# Patient Record
Sex: MALE
[De-identification: names, ages, dates, MRNs, and addresses within clinical notes are randomized per-mention and may not be internally consistent; named-entity substitution may affect disease eponyms.]

---

## 2022-04-19 ENCOUNTER — TRANSCRIPTION ENCOUNTER (OUTPATIENT)
Age: 82
End: 2022-04-19

## 2022-10-20 ENCOUNTER — NON-APPOINTMENT (OUTPATIENT)
Age: 82
End: 2022-10-20

## 2022-11-24 ENCOUNTER — NON-APPOINTMENT (OUTPATIENT)
Age: 82
End: 2022-11-24

## 2023-05-20 ENCOUNTER — NON-APPOINTMENT (OUTPATIENT)
Age: 83
End: 2023-05-20

## 2023-05-30 PROBLEM — Z00.00 ENCOUNTER FOR PREVENTIVE HEALTH EXAMINATION: Status: ACTIVE | Noted: 2023-05-30

## 2023-06-05 ENCOUNTER — APPOINTMENT (OUTPATIENT)
Dept: ORTHOPEDIC SURGERY | Facility: CLINIC | Age: 83
End: 2023-06-05
Payer: MEDICARE

## 2023-06-05 VITALS
BODY MASS INDEX: 30.8 KG/M2 | OXYGEN SATURATION: 98 % | HEIGHT: 71 IN | SYSTOLIC BLOOD PRESSURE: 130 MMHG | WEIGHT: 220 LBS | DIASTOLIC BLOOD PRESSURE: 72 MMHG | HEART RATE: 57 BPM

## 2023-06-05 DIAGNOSIS — M17.0 BILATERAL PRIMARY OSTEOARTHRITIS OF KNEE: ICD-10-CM

## 2023-06-05 PROCEDURE — 99204 OFFICE O/P NEW MOD 45 MIN: CPT

## 2023-06-05 PROCEDURE — 73564 X-RAY EXAM KNEE 4 OR MORE: CPT | Mod: 50

## 2023-06-05 NOTE — PHYSICAL EXAM
[de-identified] : Left and right knee with medial joint tenderness predominant positive crepitance and ROM 5 degrees to 125 degrees.  [de-identified] : 4 views of both knees show bilateral knee OA with varus alignment.

## 2023-06-05 NOTE — HISTORY OF PRESENT ILLNESS
[de-identified] : CHAKA PHELPS is a 82 year  old  M patient that presents today with bilateral knee pain. The pain started about a year and half ago but has gotten progressively worst with the past 6 months.Pt has been doing physical therapy for a month now which he states is is helping. The pain is described at sharp, aching with soreness. Pt denies any buckling or locking. Pt is interested in "stem cells injection" Referred to us by Cynthia Parham.\par

## 2023-06-05 NOTE — DISCUSSION/SUMMARY
[de-identified] : 82 year old active  (doubles) with OA sent by Cynthia Parham to consider PRP or other orthobiologic injections. \par He is a helathy male in no acute distrewss and very com pliant. \par We had a long discussion today about the risks and benefits of various Orthobiologic injection procedures. These included PRP, Amniotic Allograft product, Lipogems/lipoaspirate injections and BMAC. The fact that these treatments are investigational and not approved by any insurance product was also discussed and patient agreed to joining the [facility] Orthobiologic Registry and having his data stored in there and in the [facility] anonymized database for further study of Orthobiologic treatments. All questions were answered and informed consent will be obtained at the time of procedure.He will do prp for both knees. \par

## 2023-06-27 ENCOUNTER — APPOINTMENT (OUTPATIENT)
Dept: ORTHOPEDIC SURGERY | Facility: CLINIC | Age: 83
End: 2023-06-27

## 2023-09-18 ENCOUNTER — NON-APPOINTMENT (OUTPATIENT)
Age: 83
End: 2023-09-18